# Patient Record
Sex: FEMALE | Race: ASIAN | NOT HISPANIC OR LATINO
[De-identification: names, ages, dates, MRNs, and addresses within clinical notes are randomized per-mention and may not be internally consistent; named-entity substitution may affect disease eponyms.]

---

## 2017-08-20 ENCOUNTER — FORM ENCOUNTER (OUTPATIENT)
Age: 42
End: 2017-08-20

## 2017-08-29 ENCOUNTER — FORM ENCOUNTER (OUTPATIENT)
Age: 42
End: 2017-08-29

## 2018-08-21 ENCOUNTER — FORM ENCOUNTER (OUTPATIENT)
Age: 43
End: 2018-08-21

## 2018-09-03 ENCOUNTER — FORM ENCOUNTER (OUTPATIENT)
Age: 43
End: 2018-09-03

## 2018-09-13 ENCOUNTER — FORM ENCOUNTER (OUTPATIENT)
Age: 43
End: 2018-09-13

## 2019-08-19 ENCOUNTER — FORM ENCOUNTER (OUTPATIENT)
Age: 44
End: 2019-08-19

## 2019-11-10 ENCOUNTER — FORM ENCOUNTER (OUTPATIENT)
Age: 44
End: 2019-11-10

## 2021-07-16 PROBLEM — Z00.00 ENCOUNTER FOR PREVENTIVE HEALTH EXAMINATION: Status: ACTIVE | Noted: 2021-07-16

## 2021-07-23 ENCOUNTER — NON-APPOINTMENT (OUTPATIENT)
Age: 46
End: 2021-07-23

## 2021-07-26 ENCOUNTER — NON-APPOINTMENT (OUTPATIENT)
Age: 46
End: 2021-07-26

## 2021-09-09 ENCOUNTER — NON-APPOINTMENT (OUTPATIENT)
Age: 46
End: 2021-09-09

## 2021-09-09 DIAGNOSIS — R92.8 OTHER ABNORMAL AND INCONCLUSIVE FINDINGS ON DIAGNOSTIC IMAGING OF BREAST: ICD-10-CM

## 2021-09-16 ENCOUNTER — NON-APPOINTMENT (OUTPATIENT)
Age: 46
End: 2021-09-16

## 2021-09-24 ENCOUNTER — NON-APPOINTMENT (OUTPATIENT)
Age: 46
End: 2021-09-24

## 2021-09-29 ENCOUNTER — NON-APPOINTMENT (OUTPATIENT)
Age: 46
End: 2021-09-29

## 2021-09-29 ENCOUNTER — APPOINTMENT (OUTPATIENT)
Dept: BREAST CENTER | Facility: CLINIC | Age: 46
End: 2021-09-29
Payer: COMMERCIAL

## 2021-09-29 VITALS
HEART RATE: 67 BPM | WEIGHT: 116 LBS | SYSTOLIC BLOOD PRESSURE: 107 MMHG | BODY MASS INDEX: 21.35 KG/M2 | DIASTOLIC BLOOD PRESSURE: 67 MMHG | HEIGHT: 62 IN

## 2021-09-29 DIAGNOSIS — Z78.9 OTHER SPECIFIED HEALTH STATUS: ICD-10-CM

## 2021-09-29 DIAGNOSIS — D24.2 BENIGN NEOPLASM OF LEFT BREAST: ICD-10-CM

## 2021-09-29 DIAGNOSIS — N63.10 UNSPECIFIED LUMP IN THE RIGHT BREAST, UNSPECIFIED QUADRANT: ICD-10-CM

## 2021-09-29 PROCEDURE — 99214 OFFICE O/P EST MOD 30 MIN: CPT

## 2021-09-29 NOTE — PHYSICAL EXAM
[de-identified] : Breast/axilla/suprclavicular- no palpable masses, adenopathy or nipple discharge bilaterally  [de-identified] : ~5 cm x 4 cm wound with surrounding hematoma, overlying new skin forming.. no gross signs of infection.

## 2021-09-29 NOTE — HISTORY OF PRESENT ILLNESS
[FreeTextEntry1] : 9/29/21 Patient's LOV was in 11/2019 presents to the office for follow up after developing large wound s/o left MRI biopsy performed at [facility]. Biopsy revealed fibroadenoma. Radiologist who performed biopsy referred her to a dermatologist shortly after biopsy who did not seem concerned of wound and instructed she use antibiotic ointment. Wound has been improving since then. She was previously followed for significant family history. Patient denies palpable mass or nipple discharge.\par \par RAFAEL Lifetime risk:\par \par 5/19/2016-bilateral mammogram - [facility]\par 8/21/2017-bilateral MRI-[facility]\par 8/22/2018-bilateral MRI-no evidence of malignancy\par 9/4/2018-bilateral mammogram and ultrasound-no evidence of malignancy\par 8/20/2019-bilateral mammogram and ultrasound-no evidence of malignancy. \par 7/19/21 [facility] MG & US- MG- wnl; US- R- 0.6 cm mass 11:00 1FN rec 6 mth f/u, L- wnl, BIRADS 3\par 9/1/21 MRI- R- no enhancement c/w 11:00 1FN mass seen on US, L- 1.1 cm NME 6:00 4FN rec bx, ( US core vs MRI bx), BIRADS 4\par 9/10/21 L MG & US- no mammo or sono correlate to NME rec MRI bx\par 9/21/21 L MRI bx of 6:00 NME path- fibroadenoma, clip placed, concordant

## 2021-09-29 NOTE — PAST MEDICAL HISTORY
[Menarche Age ____] : age at menarche was [unfilled] [Definite ___ (Date)] : the last menstrual period was [unfilled] [Regular Cycle Intervals] : have been regular [Total Preg ___] : G[unfilled] [Live Births ___] : P[unfilled]  [Age At Live Birth ___] : Age at live birth: [unfilled] [FreeTextEntry6] : n/a [FreeTextEntry7] : no [FreeTextEntry8] : yes

## 2021-09-29 NOTE — REVIEW OF SYSTEMS
[Skin Wound] : skin wound [Negative] : Constitutional [Shortness Of Breath] : no shortness of breath

## 2022-01-31 ENCOUNTER — NON-APPOINTMENT (OUTPATIENT)
Age: 47
End: 2022-01-31

## 2022-02-10 ENCOUNTER — APPOINTMENT (OUTPATIENT)
Dept: BREAST CENTER | Facility: CLINIC | Age: 47
End: 2022-02-10

## 2022-03-21 ENCOUNTER — NON-APPOINTMENT (OUTPATIENT)
Age: 47
End: 2022-03-21

## 2022-08-21 ENCOUNTER — FORM ENCOUNTER (OUTPATIENT)
Age: 47
End: 2022-08-21

## 2022-11-11 ENCOUNTER — APPOINTMENT (OUTPATIENT)
Dept: BREAST CENTER | Facility: CLINIC | Age: 47
End: 2022-11-11

## 2022-11-11 VITALS — HEIGHT: 62 IN | BODY MASS INDEX: 21.16 KG/M2 | HEART RATE: 72 BPM | WEIGHT: 115 LBS

## 2022-11-11 VITALS — HEART RATE: 78 BPM | SYSTOLIC BLOOD PRESSURE: 87 MMHG | DIASTOLIC BLOOD PRESSURE: 56 MMHG

## 2022-11-11 DIAGNOSIS — S21.009A UNSPECIFIED OPEN WOUND OF UNSPECIFIED BREAST, INITIAL ENCOUNTER: ICD-10-CM

## 2022-11-11 DIAGNOSIS — Z78.9 OTHER SPECIFIED HEALTH STATUS: ICD-10-CM

## 2022-11-11 PROCEDURE — 99213 OFFICE O/P EST LOW 20 MIN: CPT

## 2022-11-11 NOTE — PHYSICAL EXAM
[Normocephalic] : normocephalic [EOMI] : extra ocular movement intact [Supple] : supple [No Supraclavicular Adenopathy] : no supraclavicular adenopathy [No Cervical Adenopathy] : no cervical adenopathy [de-identified] : Bilateral breast/axilla/supraclavicular area: No masses, discharge, or adenopathy [de-identified] : L scar 5:00

## 2022-11-11 NOTE — HISTORY OF PRESENT ILLNESS
[FreeTextEntry1] : Patient is a 46yo F who presents for breast cancer screening. She was previously seen for LEFT breast would following a benign MR bx yielding FA 9/29/21 performed at [facility]. She has fhx of breast cancer in sister (age 35- BRCA negative). Patient denies palpable masses, skin changes, or nipple discharge bilaterally.\par \par RAFAEL Lifetime Risk- 24.7%\par \par 5/19/16: B/l MG- [facility]\par 8/21/17: B/l MRI- [facility]\par 8/22/18: MRI- no evidence of malignancy\par 9/4/18: B/l MG & US- no evidence of malignancy\par 8/20/19: B/l MG & US- no evidence of malignancy. \par 7/19/21: B/l MG & US- wnl; US- R- 0.6 cm mass 11:00 1FN rec 6 mth f/u, L- wnl, BIRADS 3\par 9/1/21: MRI ([facility])- R- no enhancement c/w 11:00 1FN mass seen on US, L- 1.1 cm NME 6:00 4FN rec bx, ( US core vs MRI bx), BIRADS 4\par 9/10/21: L MG & US ([facility])- no mammo or sono correlate to NME rec MRI bx\par 9/21/21 L MRI bx of 6:00 NME ([facility])- fibroadenoma, clip placed, concordant \par 8/30/22: B/l MG & US- heterogenously dense. US- R 0.6cm complicated cyst 11:00 1FN. BI-RADS 2

## 2023-02-22 ENCOUNTER — APPOINTMENT (OUTPATIENT)
Dept: BREAST CENTER | Facility: CLINIC | Age: 48
End: 2023-02-22
Payer: COMMERCIAL

## 2023-02-22 ENCOUNTER — NON-APPOINTMENT (OUTPATIENT)
Age: 48
End: 2023-02-22

## 2023-02-22 PROCEDURE — 99213 OFFICE O/P EST LOW 20 MIN: CPT

## 2023-02-22 NOTE — PHYSICAL EXAM
[de-identified] : Bilateral breast/axilla/supraclavicular area: No masses, discharge, or adenopathy [de-identified] : L scar 5:00

## 2023-02-22 NOTE — HISTORY OF PRESENT ILLNESS
[FreeTextEntry1] : Patient is a 48yo F who presents for breast cancer screening. She was previously seen for LEFT breast wound following benign MR bx yielding FA 9/29/21 performed at [facility]. She has fhx of breast cancer in sister (age 35- BRCA negative). Patient denies palpable masses, skin changes, or nipple discharge bilaterally.\par \par RAFAEL Lifetime Risk- 24.7%\par \par 5/19/16: B/l MG- [facility]\par 8/21/17: B/l MRI- [facility]\par 8/22/18: MRI- no evidence of malignancy\par 9/4/18: B/l MG & US- no evidence of malignancy\par 8/20/19: B/l MG & US- no evidence of malignancy. \par 7/19/21: B/l MG & US- wnl; US- R- 0.6 cm mass 11:00 1FN rec 6 mth f/u, L- wnl, BIRADS 3\par 9/1/21: MRI ([facility])- R- no enhancement c/w 11:00 1FN mass seen on US, L- 1.1 cm NME 6:00 4FN rec bx, ( US core vs MRI bx), BIRADS 4\par 9/10/21: L MG & US ([facility])- no mammo or sono correlate to NME rec MRI bx\par 9/21/21: L MRI bx of 6:00 NME ([facility])- fibroadenoma, clip placed, concordant \par 8/30/22: B/l MG & US- heterogenously dense. US- R 0.6cm complicated cyst 11:00 1FN. BI-RADS 2\par 2/22/23: MRI- L bx clip, [facility]. BIRADS 2

## 2023-02-22 NOTE — PAST MEDICAL HISTORY
[Perimenopausal] : The patient is perimenopausal [Definite ___ (Date)] : the last menstrual period was [unfilled] [History of Hormone Replacement Treatment] : has no history of hormone replacement treatment [FreeTextEntry6] : n/a [FreeTextEntry7] : no [FreeTextEntry8] : yes

## 2023-08-31 ENCOUNTER — APPOINTMENT (OUTPATIENT)
Dept: BREAST CENTER | Facility: CLINIC | Age: 48
End: 2023-08-31

## 2023-09-08 ENCOUNTER — APPOINTMENT (OUTPATIENT)
Dept: BREAST CENTER | Facility: CLINIC | Age: 48
End: 2023-09-08
Payer: COMMERCIAL

## 2023-09-08 VITALS
HEIGHT: 62 IN | BODY MASS INDEX: 20.98 KG/M2 | WEIGHT: 114 LBS | SYSTOLIC BLOOD PRESSURE: 104 MMHG | DIASTOLIC BLOOD PRESSURE: 73 MMHG | HEART RATE: 73 BPM

## 2023-09-08 DIAGNOSIS — Z80.3 FAMILY HISTORY OF MALIGNANT NEOPLASM OF BREAST: ICD-10-CM

## 2023-09-08 DIAGNOSIS — Z12.39 ENCOUNTER FOR OTHER SCREENING FOR MALIGNANT NEOPLASM OF BREAST: ICD-10-CM

## 2023-09-08 PROCEDURE — 99213 OFFICE O/P EST LOW 20 MIN: CPT

## 2023-09-08 NOTE — PHYSICAL EXAM
[Normocephalic] : normocephalic [EOMI] : extra ocular movement intact [Supple] : supple [No Supraclavicular Adenopathy] : no supraclavicular adenopathy [No Cervical Adenopathy] : no cervical adenopathy [de-identified] : Bilateral breast/axilla/supraclavicular area: No masses, discharge, or adenopathy [de-identified] : L scar 5:00

## 2023-09-08 NOTE — HISTORY OF PRESENT ILLNESS
[FreeTextEntry1] : Patient is a 47yo F who presents for breast cancer screening. She was previously seen for LEFT breast wound following benign MR bx yielding FA 9/29/21 performed at [facility]. She has fhx of breast cancer in sister (age 35- BRCA/full panel negative). Patient denies palpable masses, skin changes, or nipple discharge bilaterally.  RAFAEL Lifetime Risk- 24.7%  5/19/16: B/l MG- [facility] 8/21/17: B/l MRI- [facility] 8/22/18: MRI- no evidence of malignancy 9/4/18: B/l MG & US- no evidence of malignancy 8/20/19: B/l MG & US- no evidence of malignancy. 7/19/21: B/l MG & US- wnl; US- R- 0.6 cm mass 11:00 1FN rec 6 mth f/u, L- wnl, BIRADS 3 9/1/21: MRI ([facility])- R- no enhancement c/w 11:00 1FN mass seen on US, L- 1.1 cm NME 6:00 4FN rec bx, ( US core vs MRI bx), BIRADS 4 9/10/21: L MG & US ([facility])- no mammo or sono correlate to NME rec MRI bx 9/21/21: L MRI bx of 6:00 NME ([facility])- fibroadenoma, clip placed, concordant  8/30/22: B/l MG & US- heterogeneously dense. US- R 0.6cm complicated cyst 11:00 1FN. BI-RADS 2 2/22/23: MRI- L bx clip, [facility]. BIRADS 2 9/8/23: B/l MG & US- heterogeneously. L bx clip. US- R 0.5cm benign cyst 11:00 4FN. BI-RADS 2

## 2023-09-08 NOTE — PAST MEDICAL HISTORY
[Perimenopausal] : The patient is perimenopausal [Menarche Age ____] : age at menarche was [unfilled] [Definite ___ (Date)] : the last menstrual period was [unfilled] [Regular Cycle Intervals] : have been regular [Total Preg ___] : G[unfilled] [Live Births ___] : P[unfilled]  [Age At Live Birth ___] : Age at live birth: [unfilled] [History of Hormone Replacement Treatment] : has no history of hormone replacement treatment [FreeTextEntry6] : n/a [FreeTextEntry7] : no [FreeTextEntry8] : yes

## 2024-02-28 ENCOUNTER — NON-APPOINTMENT (OUTPATIENT)
Age: 49
End: 2024-02-28

## 2024-02-28 ENCOUNTER — APPOINTMENT (OUTPATIENT)
Dept: BREAST CENTER | Facility: CLINIC | Age: 49
End: 2024-02-28

## 2024-04-24 ENCOUNTER — APPOINTMENT (OUTPATIENT)
Dept: BREAST CENTER | Facility: CLINIC | Age: 49
End: 2024-04-24

## 2024-04-24 ENCOUNTER — NON-APPOINTMENT (OUTPATIENT)
Age: 49
End: 2024-04-24

## 2025-01-23 ENCOUNTER — APPOINTMENT (OUTPATIENT)
Dept: BREAST CENTER | Facility: CLINIC | Age: 50
End: 2025-01-23
Payer: COMMERCIAL

## 2025-01-23 VITALS
HEIGHT: 62 IN | OXYGEN SATURATION: 99 % | WEIGHT: 117 LBS | DIASTOLIC BLOOD PRESSURE: 59 MMHG | HEART RATE: 99 BPM | SYSTOLIC BLOOD PRESSURE: 93 MMHG | BODY MASS INDEX: 21.53 KG/M2

## 2025-01-23 DIAGNOSIS — Z12.39 ENCOUNTER FOR OTHER SCREENING FOR MALIGNANT NEOPLASM OF BREAST: ICD-10-CM

## 2025-01-23 PROCEDURE — 99213 OFFICE O/P EST LOW 20 MIN: CPT
